# Patient Record
(demographics unavailable — no encounter records)

---

## 2024-12-10 NOTE — HISTORY OF PRESENT ILLNESS
[FreeTextEntry1] : Very pleasant 21-year-old gentleman who presents for evaluation of elevated testosterone.  He also reports fatigue.  He initially saw Dr. Aguirre because of fatigue.  Testosterone testing demonstrated an elevated testosterone and low cortisol.  Most recent testosterone from December 2024 was 691 with a free testosterone of 10.1.  He is currently being worked up for congenital adrenal hyperplasia.

## 2024-12-10 NOTE — ASSESSMENT
[FreeTextEntry1] : Very pleasant 21-year-old gentleman who presents for evaluation of fatigue, elevated testosterone -Most recent testosterone 691, free testosterone 10.5 -Scrotal ultrasound images reviewed demonstrating normal bilateral testes -We discussed potential etiologies of fatigue as well as potential etiologies of an elevated testosterone level and low cortisol -I recommended that he follow-up with Dr. Sharee Bowen as previously advised by Dr. Aguirre for further evaluation for CAH -All questions answered to apparent satisfaction -Follow-up as needed